# Patient Record
Sex: FEMALE | Race: WHITE | NOT HISPANIC OR LATINO | Employment: PART TIME | URBAN - METROPOLITAN AREA
[De-identification: names, ages, dates, MRNs, and addresses within clinical notes are randomized per-mention and may not be internally consistent; named-entity substitution may affect disease eponyms.]

---
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
## 2021-01-23 ENCOUNTER — HOSPITAL ENCOUNTER (OUTPATIENT)
Facility: MEDICAL CENTER | Age: 21
End: 2021-01-23
Attending: PHYSICIAN ASSISTANT
Payer: COMMERCIAL

## 2021-01-23 ENCOUNTER — OFFICE VISIT (OUTPATIENT)
Dept: URGENT CARE | Facility: CLINIC | Age: 21
End: 2021-01-23
Payer: COMMERCIAL

## 2021-01-23 VITALS
DIASTOLIC BLOOD PRESSURE: 76 MMHG | OXYGEN SATURATION: 100 % | SYSTOLIC BLOOD PRESSURE: 106 MMHG | BODY MASS INDEX: 21.51 KG/M2 | RESPIRATION RATE: 16 BRPM | HEIGHT: 64 IN | HEART RATE: 72 BPM | WEIGHT: 126 LBS | TEMPERATURE: 98.4 F

## 2021-01-23 DIAGNOSIS — R11.2 NON-INTRACTABLE VOMITING WITH NAUSEA, UNSPECIFIED VOMITING TYPE: ICD-10-CM

## 2021-01-23 DIAGNOSIS — R31.9 URINARY TRACT INFECTION WITH HEMATURIA, SITE UNSPECIFIED: ICD-10-CM

## 2021-01-23 DIAGNOSIS — N39.0 URINARY TRACT INFECTION WITH HEMATURIA, SITE UNSPECIFIED: ICD-10-CM

## 2021-01-23 LAB
APPEARANCE UR: NORMAL
BILIRUB UR STRIP-MCNC: NEGATIVE MG/DL
COLOR UR AUTO: NORMAL
GLUCOSE UR STRIP.AUTO-MCNC: NEGATIVE MG/DL
INT CON NEG: NORMAL
INT CON POS: NORMAL
KETONES UR STRIP.AUTO-MCNC: 15 MG/DL
LEUKOCYTE ESTERASE UR QL STRIP.AUTO: NORMAL
NITRITE UR QL STRIP.AUTO: NEGATIVE
PH UR STRIP.AUTO: 6.5 [PH] (ref 5–8)
POC URINE PREGNANCY TEST: NEGATIVE
PROT UR QL STRIP: 30 MG/DL
RBC UR QL AUTO: NORMAL
SP GR UR STRIP.AUTO: 1.02
UROBILINOGEN UR STRIP-MCNC: 0.2 MG/DL

## 2021-01-23 PROCEDURE — 99213 OFFICE O/P EST LOW 20 MIN: CPT | Performed by: PHYSICIAN ASSISTANT

## 2021-01-23 PROCEDURE — 81002 URINALYSIS NONAUTO W/O SCOPE: CPT | Performed by: PHYSICIAN ASSISTANT

## 2021-01-23 PROCEDURE — 87086 URINE CULTURE/COLONY COUNT: CPT

## 2021-01-23 PROCEDURE — 81025 URINE PREGNANCY TEST: CPT | Performed by: PHYSICIAN ASSISTANT

## 2021-01-23 RX ORDER — NITROFURANTOIN 25; 75 MG/1; MG/1
100 CAPSULE ORAL EVERY 12 HOURS
Qty: 10 CAP | Refills: 0 | Status: SHIPPED | OUTPATIENT
Start: 2021-01-23 | End: 2021-01-28

## 2021-01-23 RX ORDER — ONDANSETRON 4 MG/1
4 TABLET, FILM COATED ORAL EVERY 4 HOURS PRN
Qty: 20 TAB | Refills: 0 | Status: SHIPPED | OUTPATIENT
Start: 2021-01-23 | End: 2021-05-17

## 2021-01-23 ASSESSMENT — ENCOUNTER SYMPTOMS
SORE THROAT: 0
COUGH: 0
NUMBER OF EPISODES OF EMESIS TODAY: 1
SHORTNESS OF BREATH: 0
EYE DISCHARGE: 0
NAUSEA: 1
FEVER: 0
HEADACHES: 0
MYALGIAS: 0
DIARRHEA: 0
VOMITING: 1
EYE REDNESS: 0

## 2021-01-23 NOTE — PROGRESS NOTES
"Subjective:      Padmini Garcia is a 20 y.o. female who presents with Emesis (x 1 day, nausea) and Fatigue (x 1 day)        Emesis  This is a new problem. Episode onset: x 1 day ago. The problem occurs intermittently. The problem has been gradually improving. Associated symptoms include nausea and vomiting (The patient reports intermittent episodes of vomiting.  The patient states she has had 3 episodes of vomiting since the onset of symptoms.). Pertinent negatives include no chest pain, congestion, coughing, fever, headaches, myalgias, rash, sore throat or urinary symptoms. Nothing aggravates the symptoms. She has tried nothing for the symptoms.     The patient presents to clinic complaining of nausea and vomiting x1 day.  The patient states she woke up yesterday morning with a \"bad gut feeling\".  The patient states she developed subsequent nausea with 1 episode of vomiting.  The patient states her symptoms gradually improved throughout the day.  The patient states she had one additional episode of vomiting yesterday afternoon.  The patient states she woke up this morning with similar symptoms.  The patient reports 1 episode of vomiting this morning.  The patient reports continued nausea.  The patient reports no abdominal pain.  No fever.  No diarrhea.  No bloody stools.  No cough.  No congestion.  No ear pain.  No sore throat.  The patient states approximately 1 week ago she was experiencing urinary urgency with associated dysuria.  The patient states this is now improved.  The patient has not taken any medications for her current symptoms.  She reports no aggravating or alleviating factors.  The patient reports no known exposure to COVID-19.  She reports no additional sick contacts.  The patient also reports no poor food intake.  The patient states she took a pregnancy test at home, which was negative.    PMH:  has no past medical history on file.  MEDS:   Current Outpatient Medications:   •  Etonogestrel " "(NEXPLANON SC), Inject  under the skin., Disp: , Rfl:   ALLERGIES: No Known Allergies  SURGHX: History reviewed. No pertinent surgical history.  SOCHX:  reports that she has never smoked. She has never used smokeless tobacco. She reports previous alcohol use. She reports current drug use. Drugs: Marijuana and Inhaled.  FH: Family history was reviewed, no pertinent findings to report    Review of Systems   Constitutional: Negative for fever.   HENT: Negative for congestion, ear pain and sore throat.    Eyes: Negative for discharge and redness.   Respiratory: Negative for cough and shortness of breath.    Cardiovascular: Negative for chest pain and leg swelling.   Gastrointestinal: Positive for nausea and vomiting (The patient reports intermittent episodes of vomiting.  The patient states she has had 3 episodes of vomiting since the onset of symptoms.). Negative for diarrhea.   Musculoskeletal: Negative for joint pain and myalgias.   Skin: Negative for rash.   Neurological: Negative for headaches.          Objective:     /76   Pulse 72   Temp 36.9 °C (98.4 °F) (Temporal)   Resp 16   Ht 1.626 m (5' 4\")   Wt 57.2 kg (126 lb)   SpO2 100%   BMI 21.63 kg/m²      Physical Exam  Constitutional:       General: She is not in acute distress.     Appearance: Normal appearance. She is not ill-appearing.   HENT:      Head: Normocephalic and atraumatic.      Right Ear: External ear normal.      Left Ear: External ear normal.      Nose: Nose normal.      Mouth/Throat:      Mouth: Mucous membranes are moist.      Pharynx: Oropharynx is clear. No posterior oropharyngeal erythema.   Eyes:      Extraocular Movements: Extraocular movements intact.      Conjunctiva/sclera: Conjunctivae normal.   Neck:      Musculoskeletal: Normal range of motion and neck supple.   Cardiovascular:      Rate and Rhythm: Normal rate and regular rhythm.      Heart sounds: Normal heart sounds.   Pulmonary:      Effort: Pulmonary effort is normal. " No respiratory distress.      Breath sounds: Normal breath sounds. No wheezing.   Abdominal:      General: Bowel sounds are normal.      Palpations: Abdomen is soft.      Tenderness: There is abdominal tenderness (slight) in the epigastric area. There is no right CVA tenderness, left CVA tenderness, guarding or rebound.   Musculoskeletal: Normal range of motion.   Skin:     General: Skin is warm and dry.   Neurological:      Mental Status: She is alert and oriented to person, place, and time.            Progress:  Results for orders placed or performed in visit on 01/23/21   POCT Urinalysis   Result Value Ref Range    POC Color dark yellow Negative    POC Appearance slightly cloudy Negative    POC Leukocyte Esterase Trace Negative    POC Nitrites negative Negative    POC Urobiligen 0.2 Negative (0.2) mg/dL    POC Protein 30 Negative mg/dL    POC Urine PH 6.5 5.0 - 8.0    POC Blood Small Negative    POC Specific Gravity 1.025 <1.005 - >1.030    POC Ketones 15 Negative mg/dL    POC Bilirubin negative Negative mg/dL    POC Glucose negative Negative mg/dL   POCT Pregnancy   Result Value Ref Range    POC Urine Pregnancy Test negative Negative    Internal Control Positive Valid     Internal Control Negative Valid      Urine Culture - pending      Assessment/Plan:        1. Non-intractable vomiting with nausea, unspecified vomiting type  - POCT Urinalysis  - POCT Pregnancy  - ondansetron (ZOFRAN) 4 MG Tab tablet; Take 1 Tab by mouth every four hours as needed for Nausea/Vomiting.  Dispense: 20 Tab; Refill: 0    2. Urinary tract infection with hematuria, site unspecified  - Urine Culture; Future  - nitrofurantoin (MACROBID) 100 MG Cap; Take 1 Cap by mouth every 12 hours for 5 days.  Dispense: 10 Cap; Refill: 0    The patient's presenting symptoms and physical exam findings are consistent with intermittent nausea/vomiting.  On physical exam, the patient had slight tenderness to the epigastric abdominal region without  guarding or rebound.  The patient's bowel sounds were within normal limits.  No CVA tenderness was appreciated.  The remainder the patient's physical exam today in clinic was normal.  The patient's lungs were clear to auscultation without wheezing, and her pulse ox was within normal limits.  The patient appears in no acute distress.  The patient's vital signs are stable and within normal limits.  She is afebrile today in clinic.  The patient's POCT urinalysis today in clinic showed small leukocyte esterace and small blood.  The patient's POCT pregnancy test was negative.  We will culture the patient's urine to identify the possible bacterial source.  Will prescribe the patient Macrobid.  A presumed UTI.  It is unclear if the patient's nausea/vomiting may be related to her acute UTI.  Based on the patient's presenting symptoms and physical exam findings, it is unlikely the patient's symptoms are due to an acute abdominal process.  Will prescribe the patient Zofran for symptomatic relief of her nausea/vomiting.  Recommend OTC medications and supportive care for symptomatic management.  Recommend patient follow-up with PCP as needed.  Discussed return precautions with the patient, and she verbalized understanding.    Differential diagnoses, supportive care, and indications for immediate follow-up discussed with patient.   Instructed to return to clinic or nearest emergency department for any change in condition, further concerns, or worsening of symptoms.    OTC Tylenol or Motrin for fever/discomfort.  [place] diet  Drink plenty of fluids  Follow-up with PCP  Return to clinic or go to the ED if symptoms worsen or fail to improve, or if the patient should develop worsening/increasing/persistent nausea/vomiting, urinary symptoms, hematuria, flank pain, abdominal pain, fever/chills, and/or any concerning symptoms.    Discussed plan with the patient, and she agrees to the above.     Please note that this dictation was  created using voice recognition software. I have made every reasonable attempt to correct obvious errors, but I expect that there may be errors of grammar and possibly content that I did not discover before finalizing the note.

## 2021-01-24 DIAGNOSIS — R31.9 URINARY TRACT INFECTION WITH HEMATURIA, SITE UNSPECIFIED: ICD-10-CM

## 2021-01-24 DIAGNOSIS — N39.0 URINARY TRACT INFECTION WITH HEMATURIA, SITE UNSPECIFIED: ICD-10-CM

## 2021-01-26 LAB
BACTERIA UR CULT: NORMAL
SIGNIFICANT IND 70042: NORMAL
SITE SITE: NORMAL
SOURCE SOURCE: NORMAL

## 2021-05-17 ENCOUNTER — OFFICE VISIT (OUTPATIENT)
Dept: URGENT CARE | Facility: PHYSICIAN GROUP | Age: 21
End: 2021-05-17
Payer: COMMERCIAL

## 2021-05-17 VITALS
DIASTOLIC BLOOD PRESSURE: 82 MMHG | WEIGHT: 130 LBS | HEART RATE: 86 BPM | TEMPERATURE: 98.8 F | HEIGHT: 64 IN | OXYGEN SATURATION: 98 % | RESPIRATION RATE: 16 BRPM | BODY MASS INDEX: 22.2 KG/M2 | SYSTOLIC BLOOD PRESSURE: 122 MMHG

## 2021-05-17 DIAGNOSIS — A08.4 VIRAL GASTROENTERITIS: ICD-10-CM

## 2021-05-17 PROCEDURE — 99214 OFFICE O/P EST MOD 30 MIN: CPT | Performed by: PHYSICIAN ASSISTANT

## 2021-05-17 RX ORDER — ONDANSETRON 4 MG/1
4 TABLET, FILM COATED ORAL EVERY 4 HOURS PRN
Qty: 20 TABLET | Refills: 0 | Status: SHIPPED | OUTPATIENT
Start: 2021-05-17 | End: 2021-05-22

## 2021-05-17 ASSESSMENT — ENCOUNTER SYMPTOMS
VOMITING: 1
FEVER: 0
SORE THROAT: 0
NUMBER OF EPISODES OF EMESIS TODAY: 1
COUGH: 0
NAUSEA: 1
ABDOMINAL PAIN: 1

## 2021-05-17 NOTE — PROGRESS NOTES
"Subjective:   Padmini Garcia is a 21 y.o. female who presents for Nausea (vomiting, mid upper abdominal pain, [id number] )        Emesis  This is a new problem. Episode onset: 2 days  The problem occurs constantly. The problem has been gradually improving. Associated symptoms include abdominal pain (Epigastric ), nausea and vomiting. Pertinent negatives include no congestion, coughing, fever, sore throat or urinary symptoms. Treatments tried: increase fluids.     No known ill contacts. No suspicious food intake. No recent abx, hospitalization, travel.     LMP: irregular due to Nexplanon - new one administered in Nov    Review of Systems   Constitutional: Negative for fever.   HENT: Negative for congestion and sore throat.    Respiratory: Negative for cough.    Gastrointestinal: Positive for abdominal pain (Epigastric ), nausea and vomiting.       PMH:  has no past medical history on file.  MEDS:   Current Outpatient Medications:   •  ondansetron (ZOFRAN) 4 MG Tab tablet, Take 1 tablet by mouth every four hours as needed for Nausea/Vomiting for up to 5 days., Disp: 20 tablet, Rfl: 0  •  Etonogestrel (NEXPLANON SC), Inject  under the skin., Disp: , Rfl:   ALLERGIES: No Known Allergies  SURGHX: History reviewed. No pertinent surgical history.  SOCHX:  reports that she has never smoked. She has never used smokeless tobacco. She reports previous alcohol use. She reports current drug use. Drugs: Marijuana and Inhaled.  History reviewed. No pertinent family history.     Objective:   /82   Pulse 86   Temp 37.1 °C (98.8 °F) (Temporal)   Resp 16   Ht 1.626 m (5' 4\")   Wt 59 kg (130 lb)   SpO2 98%   BMI 22.31 kg/m²     Physical Exam  Vitals reviewed.   Constitutional:       General: She is not in acute distress.     Appearance: She is well-developed.   HENT:      Head: Normocephalic and atraumatic.      Right Ear: External ear normal.      Left Ear: External ear normal.   Neck:      Trachea: No tracheal " deviation.   Pulmonary:      Effort: Pulmonary effort is normal.   Abdominal:      General: There is no distension.      Palpations: Abdomen is soft.      Tenderness: There is no abdominal tenderness. There is no right CVA tenderness or left CVA tenderness.   Skin:     General: Skin is warm and dry.      Capillary Refill: Capillary refill takes less than 2 seconds.   Neurological:      Mental Status: She is alert and oriented to person, place, and time.   Psychiatric:         Mood and Affect: Mood normal.         Behavior: Behavior normal.           Assessment/Plan:     1. Viral gastroenteritis  ondansetron (ZOFRAN) 4 MG Tab tablet     Differential diagnosis, natural history, supportive care discussed. Pt directed to start bland diet. Continue to push fluids. Gastroenteritis and bland diet educational handout provided.     If symptoms worsen or persist patient can return to clinic for reevaluation.  Red flags and emergency room precautions discussed. Side effects of medication discussed. Patient confirmed understanding of information.    Please note that this dictation was created using voice recognition software. I have made every reasonable attempt to correct obvious errors, but I expect that there are errors of grammar and possibly content that I did not discover before finalizing the note.

## 2021-05-17 NOTE — LETTER
May 17, 2021         Patient: Padmini Garcia   YOB: 2000   Date of Visit: 5/17/2021           To Whom it May Concern:    Padmini Garcia was seen in my clinic on 5/17/2021. She may return to work on 5/20/21 or sooner if symptoms improve.    If you have any questions or concerns, please don't hesitate to call.        Sincerely,           Erica Virk P.A.-C.  Electronically Signed

## 2021-05-17 NOTE — PATIENT INSTRUCTIONS
Viral Gastroenteritis, Adult    Viral gastroenteritis is also known as the stomach flu. This condition may affect your stomach, small intestine, and large intestine. It can cause sudden watery diarrhea, fever, and vomiting. This condition is caused by many different viruses. These viruses can be passed from person to person very easily (are contagious).  Diarrhea and vomiting can make you feel weak and cause you to become dehydrated. You may not be able to keep fluids down. Dehydration can make you tired and thirsty, cause you to have a dry mouth, and decrease how often you urinate. It is important to replace the fluids that you lose from diarrhea and vomiting.  What are the causes?  Gastroenteritis is caused by many viruses, including rotavirus and norovirus. Norovirus is the most common cause in adults. You can get sick after being exposed to the viruses from other people. You can also get sick by:  · Eating food, drinking water, or touching a surface contaminated with one of these viruses.  · Sharing utensils or other personal items with an infected person.  What increases the risk?  You are more likely to develop this condition if you:  · Have a weak body defense system (immune system).  · Live with one or more children who are younger than 2 years old.  · Live in a nursing home.  · Travel on cruise ships.  What are the signs or symptoms?  Symptoms of this condition start suddenly 1-3 days after exposure to a virus. Symptoms may last for a few days or for as long as a week. Common symptoms include watery diarrhea and vomiting. Other symptoms include:  · Fever.  · Headache.  · Fatigue.  · Pain in the abdomen.  · Chills.  · Weakness.  · Nausea.  · Muscle aches.  · Loss of appetite.  How is this diagnosed?  This condition is diagnosed with a medical history and physical exam. You may also have a stool test to check for viruses or other infections.  How is this treated?  This condition typically goes away on its  own. The focus of treatment is to prevent dehydration and restore lost fluids (rehydration). This condition may be treated with:  · An oral rehydration solution (ORS) to replace important salts and minerals (electrolytes) in your body. Take this if told by your health care provider. This is a drink that is sold at pharmacies and retail stores.  · Medicines to help with your symptoms.  · Probiotic supplements to reduce symptoms of diarrhea.  · Fluids given through an IV, if dehydration is severe.  Older adults and people with other diseases or a weak immune system are at higher risk for dehydration.  Follow these instructions at home:    Eating and drinking    · Take an ORS as told by your health care provider.  · Drink clear fluids in small amounts as you are able. Clear fluids include:  ? Water.  ? Ice chips.  ? Diluted fruit juice.  ? Low-calorie sports drinks.  · Drink enough fluid to keep your urine pale yellow.  · Eat small amounts of healthy foods every 3-4 hours as you are able. This may include whole grains, fruits, vegetables, lean meats, and yogurt.  · Avoid fluids that contain a lot of sugar or caffeine, such as energy drinks, sports drinks, and soda.  · Avoid spicy or fatty foods.  · Avoid alcohol.  General instructions  · Wash your hands often, especially after having diarrhea or vomiting. If soap and water are not available, use hand .  · Make sure that all people in your household wash their hands well and often.  · Take over-the-counter and prescription medicines only as told by your health care provider.  · Rest at home while you recover.  · Watch your condition for any changes.  · Take a warm bath to relieve any burning or pain from frequent diarrhea episodes.  · Keep all follow-up visits as told by your health care provider. This is important.  Contact a health care provider if you:  · Cannot keep fluids down.  · Have symptoms that get worse.  · Have new symptoms.  · Feel light-headed or  dizzy.  · Have muscle cramps.  Get help right away if you:  · Have chest pain.  · Feel extremely weak or you faint.  · See blood in your vomit.  · Have vomit that looks like coffee grounds.  · Have bloody or black stools or stools that look like tar.  · Have a severe headache, a stiff neck, or both.  · Have a rash.  · Have severe pain, cramping, or bloating in your abdomen.  · Have trouble breathing or you are breathing very quickly.  · Have a fast heartbeat.  · Have skin that feels cold and clammy.  · Feel confused.  · Have pain when you urinate.  · Have signs of dehydration, such as:  ? Dark urine, very little urine, or no urine.  ? Cracked lips.  ? Dry mouth.  ? Sunken eyes.  ? Sleepiness.  ? Weakness.  Summary  · Viral gastroenteritis is also known as the stomach flu. It can cause sudden watery diarrhea, fever, and vomiting.  · This condition can be passed from person to person very easily (is contagious).  · Take an ORS if told by your health care provider. This is a drink that is sold at pharmacies and retail stores.  · Wash your hands often, especially after having diarrhea or vomiting. If soap and water are not available, use hand .  This information is not intended to replace advice given to you by your health care provider. Make sure you discuss any questions you have with your health care provider.  Document Released: 12/18/2006 Document Revised: 10/23/2019 Document Reviewed: 10/23/2019  [employer] Patient Education © 2020 [employer]      [place] Diet  A bland diet consists of foods that are often soft and do not have a lot of fat, fiber, or extra seasonings. Foods without fat, fiber, or seasoning are easier for the body to digest. They are also less likely to irritate your mouth, throat, stomach, and other parts of your digestive system. A bland diet is sometimes called a BRAT diet.  What is my plan?  Your health care provider or food and nutrition specialist (dietitian) may recommend specific  changes to your diet to prevent symptoms or to treat your symptoms. These changes may include:  · Eating small meals often.  · Cooking food until it is soft enough to chew easily.  · Chewing your food well.  · Drinking fluids slowly.  · Not eating foods that are very spicy, sour, or fatty.  · Not eating citrus fruits, such as oranges and grapefruit.  What do I need to know about this diet?  · Eat a variety of foods from the bland diet food list.  · Do not follow a bland diet longer than needed.  · Ask your health care provider whether you should take vitamins or supplements.  What foods can I eat?  Grains    Hot cereals, such as cream of wheat. Rice. Bread, crackers, or tortillas made from refined white flour.  Vegetables  Canned or cooked vegetables. Mashed or boiled potatoes.  Fruits    Bananas. Applesauce. Other types of cooked or canned fruit with the skin and seeds removed, such as canned peaches or pears.  Meats and other proteins    Scrambled eggs. Creamy peanut butter or other nut butters. Lean, well-cooked meats, such as chicken or fish. Tofu. Soups or broths.  Dairy  Low-fat dairy products, such as milk, cottage cheese, or yogurt.  Beverages    Water. Herbal tea. Apple juice.  Fats and oils  Mild salad dressings. Canola or olive oil.  Sweets and desserts  Pudding. Custard. Fruit gelatin. Ice cream.  The items listed above may not be a complete list of recommended foods and beverages. Contact a dietitian for more options.  What foods are not recommended?  Grains  Whole grain breads and cereals.  Vegetables  Raw vegetables.  Fruits  Raw fruits, especially citrus, berries, or dried fruits.  Dairy  Whole fat dairy foods.  Beverages  Caffeinated drinks. Alcohol.  Seasonings and condiments  Strongly flavored seasonings or condiments. Hot sauce. Salsa.  Other foods  Spicy foods. Fried foods. Sour foods, such as pickled or fermented foods. Foods with high sugar content. Foods high in fiber.  The items listed above  may not be a complete list of foods and beverages to avoid. Contact a dietitian for more information.  Summary  · A bland diet consists of foods that are often soft and do not have a lot of fat, fiber, or extra seasonings.  · Foods without fat, fiber, or seasoning are easier for the body to digest.  · Check with your health care provider to see how long you should follow this diet plan. It is not meant to be followed for long periods.  This information is not intended to replace advice given to you by your health care provider. Make sure you discuss any questions you have with your health care provider.  Document Released: 04/10/2017 Document Revised: 01/16/2019 Document Reviewed: 01/16/2019  [employer] Patient Education © 2020 [employer]

## 2021-05-19 ENCOUNTER — TELEPHONE (OUTPATIENT)
Dept: URGENT CARE | Facility: PHYSICIAN GROUP | Age: 21
End: 2021-05-19

## 2021-05-19 NOTE — TELEPHONE ENCOUNTER
Patient would like a call at [phone] from Erica. Erica Virk regarding her during recent UC visit. States she is not getting better and is trying not to go to ER and would like to know if Erica can call her and talk about whether or not she can come back to UC for further eval or if she HAS to go to the ER.

## 2021-05-20 NOTE — TELEPHONE ENCOUNTER
Spoke with pt, her sx have not improved. She is having difficulty keeping food and water down. Recommended evaluation in ED for IV hydration and further work up/treatment.

## 2021-05-23 ENCOUNTER — HOSPITAL ENCOUNTER (EMERGENCY)
Facility: MEDICAL CENTER | Age: 21
End: 2021-05-23
Attending: EMERGENCY MEDICINE
Payer: COMMERCIAL

## 2021-05-23 VITALS
HEIGHT: 64 IN | RESPIRATION RATE: 18 BRPM | WEIGHT: 130.07 LBS | HEART RATE: 65 BPM | BODY MASS INDEX: 22.21 KG/M2 | TEMPERATURE: 98.9 F | SYSTOLIC BLOOD PRESSURE: 110 MMHG | DIASTOLIC BLOOD PRESSURE: 68 MMHG | OXYGEN SATURATION: 94 %

## 2021-05-23 DIAGNOSIS — R19.7 NAUSEA VOMITING AND DIARRHEA: ICD-10-CM

## 2021-05-23 DIAGNOSIS — R11.2 NAUSEA VOMITING AND DIARRHEA: ICD-10-CM

## 2021-05-23 LAB
ALBUMIN SERPL BCP-MCNC: 4.6 G/DL (ref 3.2–4.9)
ALBUMIN/GLOB SERPL: 1.8 G/DL
ALP SERPL-CCNC: 57 U/L (ref 30–99)
ALT SERPL-CCNC: 6 U/L (ref 2–50)
ANION GAP SERPL CALC-SCNC: 11 MMOL/L (ref 7–16)
APPEARANCE UR: ABNORMAL
AST SERPL-CCNC: 13 U/L (ref 12–45)
BACTERIA #/AREA URNS HPF: ABNORMAL /HPF
BASOPHILS # BLD AUTO: 0.7 % (ref 0–1.8)
BASOPHILS # BLD: 0.03 K/UL (ref 0–0.12)
BILIRUB SERPL-MCNC: 0.7 MG/DL (ref 0.1–1.5)
BILIRUB UR QL STRIP.AUTO: NEGATIVE
BUN SERPL-MCNC: 13 MG/DL (ref 8–22)
CALCIUM SERPL-MCNC: 9.3 MG/DL (ref 8.4–10.2)
CHLORIDE SERPL-SCNC: 101 MMOL/L (ref 96–112)
CO2 SERPL-SCNC: 24 MMOL/L (ref 20–33)
COLOR UR: YELLOW
CREAT SERPL-MCNC: 0.69 MG/DL (ref 0.5–1.4)
EOSINOPHIL # BLD AUTO: 0.07 K/UL (ref 0–0.51)
EOSINOPHIL NFR BLD: 1.7 % (ref 0–6.9)
EPI CELLS #/AREA URNS HPF: ABNORMAL /HPF
ERYTHROCYTE [DISTWIDTH] IN BLOOD BY AUTOMATED COUNT: 38.9 FL (ref 35.9–50)
GLOBULIN SER CALC-MCNC: 2.5 G/DL (ref 1.9–3.5)
GLUCOSE SERPL-MCNC: 88 MG/DL (ref 65–99)
GLUCOSE UR STRIP.AUTO-MCNC: NEGATIVE MG/DL
HCG SERPL QL: NEGATIVE
HCT VFR BLD AUTO: 40.6 % (ref 37–47)
HGB BLD-MCNC: 13.5 G/DL (ref 12–16)
HYALINE CASTS #/AREA URNS LPF: ABNORMAL /LPF
IMM GRANULOCYTES # BLD AUTO: 0.01 K/UL (ref 0–0.11)
IMM GRANULOCYTES NFR BLD AUTO: 0.2 % (ref 0–0.9)
KETONES UR STRIP.AUTO-MCNC: NEGATIVE MG/DL
LEUKOCYTE ESTERASE UR QL STRIP.AUTO: NEGATIVE
LIPASE SERPL-CCNC: 58 U/L (ref 7–58)
LYMPHOCYTES # BLD AUTO: 1.14 K/UL (ref 1–4.8)
LYMPHOCYTES NFR BLD: 28.1 % (ref 22–41)
MCH RBC QN AUTO: 28.4 PG (ref 27–33)
MCHC RBC AUTO-ENTMCNC: 33.3 G/DL (ref 33.6–35)
MCV RBC AUTO: 85.5 FL (ref 81.4–97.8)
MICRO URNS: ABNORMAL
MONOCYTES # BLD AUTO: 0.33 K/UL (ref 0–0.85)
MONOCYTES NFR BLD AUTO: 8.1 % (ref 0–13.4)
NEUTROPHILS # BLD AUTO: 2.47 K/UL (ref 2–7.15)
NEUTROPHILS NFR BLD: 61.2 % (ref 44–72)
NITRITE UR QL STRIP.AUTO: NEGATIVE
NRBC # BLD AUTO: 0 K/UL
NRBC BLD-RTO: 0 /100 WBC
PH UR STRIP.AUTO: 5.5 [PH] (ref 5–8)
PLATELET # BLD AUTO: 201 K/UL (ref 164–446)
PMV BLD AUTO: 11.1 FL (ref 9–12.9)
POTASSIUM SERPL-SCNC: 3.8 MMOL/L (ref 3.6–5.5)
PROT SERPL-MCNC: 7.1 G/DL (ref 6–8.2)
PROT UR QL STRIP: NEGATIVE MG/DL
RBC # BLD AUTO: 4.75 M/UL (ref 4.2–5.4)
RBC # URNS HPF: ABNORMAL /HPF
RBC UR QL AUTO: ABNORMAL
SODIUM SERPL-SCNC: 136 MMOL/L (ref 135–145)
SP GR UR STRIP.AUTO: >=1.03
WBC # BLD AUTO: 4.1 K/UL (ref 4.8–10.8)
WBC #/AREA URNS HPF: ABNORMAL /HPF

## 2021-05-23 PROCEDURE — 700111 HCHG RX REV CODE 636 W/ 250 OVERRIDE (IP): Performed by: EMERGENCY MEDICINE

## 2021-05-23 PROCEDURE — A9270 NON-COVERED ITEM OR SERVICE: HCPCS | Performed by: EMERGENCY MEDICINE

## 2021-05-23 PROCEDURE — 81001 URINALYSIS AUTO W/SCOPE: CPT

## 2021-05-23 PROCEDURE — 700105 HCHG RX REV CODE 258: Performed by: EMERGENCY MEDICINE

## 2021-05-23 PROCEDURE — 80053 COMPREHEN METABOLIC PANEL: CPT

## 2021-05-23 PROCEDURE — 85025 COMPLETE CBC W/AUTO DIFF WBC: CPT

## 2021-05-23 PROCEDURE — 87591 N.GONORRHOEAE DNA AMP PROB: CPT

## 2021-05-23 PROCEDURE — 87491 CHLMYD TRACH DNA AMP PROBE: CPT

## 2021-05-23 PROCEDURE — 83690 ASSAY OF LIPASE: CPT

## 2021-05-23 PROCEDURE — 700102 HCHG RX REV CODE 250 W/ 637 OVERRIDE(OP): Performed by: EMERGENCY MEDICINE

## 2021-05-23 PROCEDURE — 99285 EMERGENCY DEPT VISIT HI MDM: CPT

## 2021-05-23 PROCEDURE — 84703 CHORIONIC GONADOTROPIN ASSAY: CPT

## 2021-05-23 PROCEDURE — 96374 THER/PROPH/DIAG INJ IV PUSH: CPT

## 2021-05-23 RX ORDER — ONDANSETRON 4 MG/1
4 TABLET, ORALLY DISINTEGRATING ORAL EVERY 8 HOURS PRN
Qty: 10 TABLET | Refills: 0 | Status: SHIPPED | OUTPATIENT
Start: 2021-05-23 | End: 2022-07-28

## 2021-05-23 RX ORDER — ONDANSETRON 2 MG/ML
4 INJECTION INTRAMUSCULAR; INTRAVENOUS ONCE
Status: COMPLETED | OUTPATIENT
Start: 2021-05-23 | End: 2021-05-23

## 2021-05-23 RX ORDER — SODIUM CHLORIDE 9 MG/ML
1000 INJECTION, SOLUTION INTRAVENOUS ONCE
Status: COMPLETED | OUTPATIENT
Start: 2021-05-23 | End: 2021-05-23

## 2021-05-23 RX ADMIN — ONDANSETRON 4 MG: 2 INJECTION INTRAMUSCULAR; INTRAVENOUS at 09:41

## 2021-05-23 RX ADMIN — LIDOCAINE HYDROCHLORIDE 30 ML: 20 SOLUTION OROPHARYNGEAL at 09:41

## 2021-05-23 RX ADMIN — SODIUM CHLORIDE 1000 ML: 9 INJECTION, SOLUTION INTRAVENOUS at 09:41

## 2021-05-23 NOTE — ED NOTES
Discharge instructions given and discussed. RX for Zofran sent to preferred pharmacy  and pt educated. Pt educated to come back to ER for new or worsening symptoms and follow up with PCP as instructed. Pt verbalized understanding. VSS. Pt  Discharged in stable condition.

## 2021-05-23 NOTE — ED NOTES
Med rec updated and complete  Allergies reviewed  Interviewed pt with boyfriend at bedside with permission from pt.  Pt reports no antibiotics in the last 2 weeks     No current facility-administered medications on file prior to encounter.     Current Outpatient Medications on File Prior to Encounter   Medication Sig Dispense Refill   • multivitamin (THERAGRAN) Tab Take 1 tablet by mouth every day. Pt started on 5/21/2021     • etonogestrel (NEXPLANON) 68 MG Implant implant Inject 68 mg under the skin see administration instructions. Every 3 years

## 2021-05-23 NOTE — ED TRIAGE NOTES
"Pt was seen at [facility] the 17 th of this month, and consulted via phone 2 days later for evaluation and treatment of diffuse/epigastric abdominal pain with associated nausea and vomiting recurring episodically for approximately a week.  She continues to experience unresolved symptomatology.  For the past 2 days she has also experienced transitory bouts of thin, watery diarrhea.  Chief Complaint   Patient presents with   • Abdominal Pain   • Nausea/Vomiting/Diarrhea     /87   Pulse 78   Temp 36.7 °C (98 °F) (Temporal)   Resp 18   Ht 1.626 m (5' 4\")   Wt 59 kg (130 lb 1.1 oz)   SpO2 98%   BMI 22.33 kg/m²       "

## 2021-05-23 NOTE — ED PROVIDER NOTES
ED Provider Note    CHIEF COMPLAINT  Chief Complaint   Patient presents with   • Abdominal Pain   • Nausea/Vomiting/Diarrhea       HPI  Padmini Garcia is a 21 y.o. female who presents to the emergency department the chief complaint of nausea, vomiting, and diarrhea.  Symptoms started about a week ago.  She has epigastric abdominal discomfort associated with nausea, vomiting, and diarrhea.  She was seen at urgent care and also had a telemedicine consultation.  Diarrhea seems to be getting better but she continues to have some nausea and vomiting.  She is having difficulty tolerating anything orally without vomiting.  Therefore the patient comes to the emergency department for evaluation.  She is not had any fever.  No lower abdominal pain.  No dysuria.  No vaginal bleeding or discharge.  Pain and discomfort is localized to the upper abdomen.  No tenderness or pain in the right upper quadrant.    REVIEW OF SYSTEMS  See HPI for further details. All other systems are negative.     PAST MEDICAL HISTORY  History reviewed. No pertinent past medical history.    FAMILY HISTORY  History reviewed. No pertinent family history.    SOCIAL HISTORY  Social History     Socioeconomic History   • Marital status: Single     Spouse name: Not on file   • Number of children: Not on file   • Years of education: Not on file   • Highest education level: Not on file   Occupational History   • Not on file   Tobacco Use   • Smoking status: Never Smoker   • Smokeless tobacco: Never Used   Vaping Use   • Vaping Use: Never used   Substance and Sexual Activity   • Alcohol use: Not Currently   • Drug use: Yes     Types: Marijuana, Inhaled   • Sexual activity: Not on file   Other Topics Concern   • Not on file   Social History Narrative   • Not on file     Social Determinants of Health     Financial Resource Strain:    • Difficulty of Paying Living Expenses:    Food Insecurity:    • Worried About Running Out of Food in the Last Year:    • Ran Out  "of Food in the Last Year:    Transportation Needs:    • Lack of Transportation (Medical):    • Lack of Transportation (Non-Medical):    Physical Activity:    • Days of Exercise per Week:    • Minutes of Exercise per Session:    Stress:    • Feeling of Stress :    Social Connections:    • Frequency of Communication with Friends and Family:    • Frequency of Social Gatherings with Friends and Family:    • Attends Anabaptism Services:    • Active Member of Clubs or Organizations:    • Attends Club or Organization Meetings:    • Marital Status:    Intimate Partner Violence:    • Fear of Current or Ex-Partner:    • Emotionally Abused:    • Physically Abused:    • Sexually Abused:        SURGICAL HISTORY  History reviewed. No pertinent surgical history.    CURRENT MEDICATIONS  Home Medications    **Home medications have not yet been reviewed for this encounter**         ALLERGIES  No Known Allergies    PHYSICAL EXAM  VITAL SIGNS: /87   Pulse 78   Temp 36.7 °C (98 °F) (Temporal)   Resp 18   Ht 1.626 m (5' 4\")   Wt 59 kg (130 lb 1.1 oz)   SpO2 98%   BMI 22.33 kg/m²   Constitutional: Well developed, Well nourished, no acute distress, Non-toxic appearance.   HENT: Normocephalic, Atraumatic, Bilateral external ears normal, Oropharynx moist, No oral exudates, Nose normal.   Eyes: PERRL, EOMI, Conjunctiva normal, No discharge.   Neck: Normal range of motion, No tenderness, Supple, No stridor.   Lymphatic: No lymphadenopathy noted.   Cardiovascular: Normal heart rate, Normal rhythm, No murmurs, No rubs, No gallops.   Thorax & Lungs: Normal breath sounds, No respiratory distress, No wheezing, No chest tenderness.   Abdomen: Soft, unable to elicit any abdominal tenderness to palpation, negative Marin sign, no rebound or guarding.  Active bowel sounds.  Skin: Warm, Dry, No erythema, No rash.   Back: No tenderness, No CVA tenderness.   Extremities: Intact distal pulses, No edema, No tenderness, No cyanosis, No clubbing. "   Neurologic: Alert & oriented x 3, Normal motor function, Normal sensory function, No focal deficits noted.       RADIOLOGY/PROCEDURES  No orders to display     Results for orders placed or performed during the hospital encounter of 05/23/21   CBC WITH DIFFERENTIAL   Result Value Ref Range    WBC 4.1 (L) 4.8 - 10.8 K/uL    RBC 4.75 4.20 - 5.40 M/uL    Hemoglobin 13.5 12.0 - 16.0 g/dL    Hematocrit 40.6 37.0 - 47.0 %    MCV 85.5 81.4 - 97.8 fL    MCH 28.4 27.0 - 33.0 pg    MCHC 33.3 (L) 33.6 - 35.0 g/dL    RDW 38.9 35.9 - 50.0 fL    Platelet Count 201 164 - 446 K/uL    MPV 11.1 9.0 - 12.9 fL    Neutrophils-Polys 61.20 44.00 - 72.00 %    Lymphocytes 28.10 22.00 - 41.00 %    Monocytes 8.10 0.00 - 13.40 %    Eosinophils 1.70 0.00 - 6.90 %    Basophils 0.70 0.00 - 1.80 %    Immature Granulocytes 0.20 0.00 - 0.90 %    Nucleated RBC 0.00 /100 WBC    Neutrophils (Absolute) 2.47 2.00 - 7.15 K/uL    Lymphs (Absolute) 1.14 1.00 - 4.80 K/uL    Monos (Absolute) 0.33 0.00 - 0.85 K/uL    Eos (Absolute) 0.07 0.00 - 0.51 K/uL    Baso (Absolute) 0.03 0.00 - 0.12 K/uL    Immature Granulocytes (abs) 0.01 0.00 - 0.11 K/uL    NRBC (Absolute) 0.00 K/uL   COMP METABOLIC PANEL   Result Value Ref Range    Sodium 136 135 - 145 mmol/L    Potassium 3.8 3.6 - 5.5 mmol/L    Chloride 101 96 - 112 mmol/L    Co2 24 20 - 33 mmol/L    Anion Gap 11.0 7.0 - 16.0    Glucose 88 65 - 99 mg/dL    Bun 13 8 - 22 mg/dL    Creatinine 0.69 0.50 - 1.40 mg/dL    Calcium 9.3 8.4 - 10.2 mg/dL    AST(SGOT) 13 12 - 45 U/L    ALT(SGPT) 6 2 - 50 U/L    Alkaline Phosphatase 57 30 - 99 U/L    Total Bilirubin 0.7 0.1 - 1.5 mg/dL    Albumin 4.6 3.2 - 4.9 g/dL    Total Protein 7.1 6.0 - 8.2 g/dL    Globulin 2.5 1.9 - 3.5 g/dL    A-G Ratio 1.8 g/dL   LIPASE   Result Value Ref Range    Lipase 58 7 - 58 U/L   URINALYSIS CULTURE, IF INDICATED    Specimen: Urine, Clean Catch   Result Value Ref Range    Color Yellow     Character Hazy (A)     Specific Gravity >=1.030 <1.035    Ph  5.5 5.0 - 8.0    Glucose Negative Negative mg/dL    Ketones Negative Negative mg/dL    Protein Negative Negative mg/dL    Bilirubin Negative Negative    Nitrite Negative Negative    Leukocyte Esterase Negative Negative    Occult Blood Trace (A) Negative    Micro Urine Req Microscopic    HCG QUAL SERUM   Result Value Ref Range    Beta-Hcg Qualitative Serum Negative Negative   URINE MICROSCOPIC (W/UA)   Result Value Ref Range    WBC 2-5 /hpf    RBC 2-5 (A) /hpf    Bacteria Many (A) None /hpf    Epithelial Cells Many (A) Few /hpf    Hyaline Cast 0-2 /lpf   ESTIMATED GFR   Result Value Ref Range    GFR If African American >60 >60 mL/min/1.73 m 2    GFR If Non African American >60 >60 mL/min/1.73 m 2   Chlamydia/GC PCR Urine Or Swab    Specimen: Urine, First Catch; Genital   Result Value Ref Range    Source Urine        COURSE & MEDICAL DECISION MAKING  Pertinent Labs & Imaging studies reviewed. (See chart for details)    Patient presents today with epigastric abdominal discomfort.  Her symptoms occur within the context of multiple patients presenting to the emergency department with similar complaints.  An IV is established.  Laboratory studies are obtained.  Differential diagnosis includes viral gastroenteritis, electrolyte abnormality, dehydration, gastritis, peptic ulcer disease.  She does not have any tenderness in the right upper quadrant to make me concerned for cholecystitis.  She has no lower abdominal pain or tenderness to make me concerned for pelvic infection or appendicitis.    Laboratory studies as above.  Urinalysis appears to be contaminated but not consistent with filomena infection.  Discussed this with the patient.  She says that she has no vaginal discharge and has no concern for pelvic infection.  Urine gonorrhea and chlamydia is added as a precaution.    CBC and metabolic panel remarkable for no significant electrolyte abnormality.  Liver function tests are normal.  On recheck the patient says that she  is feeling better after hydration and treatment with Zofran.  She will given a prescription for Zofran.  Return precautions discussed.  Discharged in improved condition.    The patient will return for new or worsening symptoms and is stable at the time of discharge.    The patient is referred to a primary physician for blood pressure management, diabetic screening, and for all other preventative health concerns.    DISPOSITION:  Patient will be discharged home in stable condition.    FOLLOW UP:  [facility], Emergency Dept  [address]  [place] [address]  [phone]  Schedule an appointment as soon as possible for a visit         OUTPATIENT MEDICATIONS:  Discharge Medication List as of 5/23/2021 10:29 AM      START taking these medications    Details   ondansetron (ZOFRAN ODT) 4 MG TABLET DISPERSIBLE Take 1 tablet by mouth every 8 hours as needed., Disp-10 tablet, R-0, Normal               HYDRATION: Based on the patient's presentation of Acute Vomiting the patient was given IV fluids. IV Hydration was used because oral hydration was not adequate alone. Upon recheck following hydration, the patient was improved.      FINAL IMPRESSION  No diagnosis found.        Electronically signed by: Sukumar Schmitt M.D., 5/23/2021  10:42 AM

## 2021-05-24 LAB
C TRACH DNA SPEC QL NAA+PROBE: POSITIVE
N GONORRHOEA DNA SPEC QL NAA+PROBE: NEGATIVE
SPECIMEN SOURCE: ABNORMAL

## 2021-05-25 ENCOUNTER — TELEPHONE (OUTPATIENT)
Dept: SCHEDULING | Facility: IMAGING CENTER | Age: 21
End: 2021-05-25

## 2021-05-25 RX ORDER — AZITHROMYCIN 500 MG/1
1000 TABLET, FILM COATED ORAL ONCE
Qty: 2 TABLET | Refills: 0 | Status: SHIPPED | OUTPATIENT
Start: 2021-05-25 | End: 2021-05-25

## 2021-05-26 NOTE — ED NOTES
"ED Positive Culture Follow-up/Notification Note:    Date: 5/25/2021     Patient seen in the ED on 5/23/2021 for abdominal pain, nausea, vomiting, and diarrhea.   1. Nausea vomiting and diarrhea       Discharge Medication List as of 5/23/2021 10:29 AM      START taking these medications    Details   ondansetron (ZOFRAN ODT) 4 MG TABLET DISPERSIBLE Take 1 tablet by mouth every 8 hours as needed., Disp-10 tablet, R-0, Normal             Allergies: Patient has no known allergies.     Vitals:    05/23/21 0830 05/23/21 1033 05/23/21 1034   BP: 126/87 110/68    Pulse: 78  65   Resp: 18     Temp: 36.7 °C (98 °F)  37.2 °C (98.9 °F)   TempSrc: Temporal  Temporal   SpO2: 98%  94%   Weight: 59 kg (130 lb 1.1 oz)     Height: 1.626 m (5' 4\")         Final cultures:   Results     Procedure Component Value Units Date/Time    Chlamydia/GC PCR Urine Or Swab [[id number]]  (Abnormal) Collected: 05/23/21 0926    Order Status: Completed Specimen: Genital from Urine, First Catch Updated: 05/24/21 1846     C. trachomatis by PCR POSITIVE     N. gonorrhoeae by PCR Negative     Source Urine    URINALYSIS CULTURE, IF INDICATED [[id number]]  (Abnormal) Collected: 05/23/21 0926    Order Status: Completed Specimen: Urine, Clean Catch Updated: 05/23/21 0944     Color Yellow     Character Hazy     Specific Gravity >=1.030     Ph 5.5     Glucose Negative mg/dL      Ketones Negative mg/dL      Protein Negative mg/dL      Bilirubin Negative     Nitrite Negative     Leukocyte Esterase Negative     Occult Blood Trace     Micro Urine Req Microscopic    Narrative:      Indication for culture:->Patient WITHOUT an indwelling Allison  catheter in place with new onset of Dysuria, Frequency,  Urgency, and/or Suprapubic pain          Plan:   Patient was not treated for gonorrhea in the ER. Spoke to the patient via telephone. Counseled the patient to abstain from sexual intercourse 7 days after antibiotic therapy is completed, to notify any sexual partners within " the last 60 days to go the the Health Department for testing, to seek further HIV/STD testing, and to seek medical attention if symptoms persist. Patient voiced understanding and did not have any other questions.    Discussed culture result and change in antibiotics with patient, who will  new prescription at [facility] on [address] .    New Rx:  Azithromycin 1000 mg  x 1 days #1, no refills - MD: Oskar per protocol    Suraj Meier, PharmD

## 2021-06-01 ENCOUNTER — APPOINTMENT (OUTPATIENT)
Dept: MEDICAL GROUP | Facility: MEDICAL CENTER | Age: 21
End: 2021-06-01
Payer: COMMERCIAL

## 2022-07-28 ENCOUNTER — OFFICE VISIT (OUTPATIENT)
Dept: URGENT CARE | Facility: CLINIC | Age: 22
End: 2022-07-28
Payer: COMMERCIAL

## 2022-07-28 VITALS
BODY MASS INDEX: 23.66 KG/M2 | TEMPERATURE: 98.7 F | WEIGHT: 138.6 LBS | OXYGEN SATURATION: 98 % | HEART RATE: 72 BPM | SYSTOLIC BLOOD PRESSURE: 112 MMHG | RESPIRATION RATE: 16 BRPM | HEIGHT: 64 IN | DIASTOLIC BLOOD PRESSURE: 64 MMHG

## 2022-07-28 DIAGNOSIS — H65.91 RIGHT NON-SUPPURATIVE OTITIS MEDIA: ICD-10-CM

## 2022-07-28 PROCEDURE — 99213 OFFICE O/P EST LOW 20 MIN: CPT | Performed by: FAMILY MEDICINE

## 2022-07-28 RX ORDER — AMOXICILLIN AND CLAVULANATE POTASSIUM 875; 125 MG/1; MG/1
1 TABLET, FILM COATED ORAL 2 TIMES DAILY
Qty: 10 TABLET | Refills: 0 | Status: SHIPPED | OUTPATIENT
Start: 2022-07-28 | End: 2022-08-02

## 2022-07-28 ASSESSMENT — FIBROSIS 4 INDEX: FIB4 SCORE: 0.55

## 2022-07-28 NOTE — PROGRESS NOTES
"  Subjective:      22 y.o. female presents to urgent care for right ear pain that started yesterday.  The pain is intermittent, coming mostly at night, it is described as achy, currently rated 0/10.  There are some associated decreased hearing.  She has been using ibuprofen with good relief in symptoms. She denies any tobacco product use.  No history of asthma or COPD.  She is not vaccinated against COVID.  No known sick contacts.    She denies any other questions or concerns at this time.    Current problem list, medication, and past medical/surgical history were reviewed in Epic.    ROS  See HPI     Objective:      /64 (BP Location: Left arm, Patient Position: Sitting, BP Cuff Size: Adult)   Pulse 72   Temp 37.1 °C (98.7 °F) (Temporal)   Resp 16   Ht 1.626 m (5' 4\")   Wt 62.9 kg (138 lb 9.6 oz)   SpO2 98%   BMI 23.79 kg/m²     Physical Exam  Constitutional:       General: She is not in acute distress.     Appearance: She is not diaphoretic.   HENT:      Right Ear: Ear canal and external ear normal. Tympanic membrane is erythematous and bulging.      Left Ear: Tympanic membrane, ear canal and external ear normal.      Mouth/Throat:      Tongue: Tongue does not deviate from midline.      Palate: No lesions.      Pharynx: Uvula midline. No posterior oropharyngeal erythema.      Tonsils: No tonsillar exudate. 1+ on the right. 1+ on the left.   Cardiovascular:      Rate and Rhythm: Normal rate and regular rhythm.      Heart sounds: Normal heart sounds.   Pulmonary:      Effort: Pulmonary effort is normal. No respiratory distress.      Breath sounds: Normal breath sounds.   Neurological:      Mental Status: She is alert.   Psychiatric:         Mood and Affect: Affect normal.         Judgment: Judgment normal.       Assessment/Plan:     1. Right non-suppurative otitis media  Prescription for amoxicillin has been sent.  Tylenol ibuprofen as needed for symptomatic relief.  - amoxicillin-clavulanate (AUGMENTIN) " 875-125 MG Tab; Take 1 Tablet by mouth 2 times a day for 5 days.  Dispense: 10 Tablet; Refill: 0      Instructed to return to Urgent Care or nearest Emergency Department if symptoms fail to improve, for any change in condition, further concerns, or new concerning symptoms. Patient states understanding of the plan of care and discharge instructions.    Lynn Ferreira M.D.